# Patient Record
Sex: MALE | URBAN - METROPOLITAN AREA
[De-identification: names, ages, dates, MRNs, and addresses within clinical notes are randomized per-mention and may not be internally consistent; named-entity substitution may affect disease eponyms.]

---

## 2021-11-22 ENCOUNTER — OFFICE VISIT (OUTPATIENT)
Dept: URBAN - METROPOLITAN AREA CLINIC 23 | Facility: CLINIC | Age: 66
End: 2021-11-22
Payer: MEDICARE

## 2021-11-22 DIAGNOSIS — H25.813 COMBINED FORMS OF AGE-RELATED CATARACT, BILATERAL: Primary | ICD-10-CM

## 2021-11-22 PROCEDURE — 92004 COMPRE OPH EXAM NEW PT 1/>: CPT | Performed by: OPTOMETRIST

## 2021-11-22 ASSESSMENT — INTRAOCULAR PRESSURE
OD: 24
OS: 24

## 2021-11-22 ASSESSMENT — KERATOMETRY
OS: 45.88
OD: 43.00

## 2021-11-22 NOTE — IMPRESSION/PLAN
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 Condition: worsening. Plan: Cataracts account for the patient's complaints. Discussed diagnosis in detail with patient. Discussed all R/B's and procedures. Patient understands changing glasses prescription will not significantly improve vision. Patient elects to have surgery, phacoemulsification with intraocular lens recommended. Discussed lens options of Toric/Multifocal, or Standard lens, RL2. Will refer to cataract surgeon for pre-operative evaluation.

## 2022-01-07 ENCOUNTER — OFFICE VISIT (OUTPATIENT)
Dept: URBAN - METROPOLITAN AREA CLINIC 23 | Facility: CLINIC | Age: 67
End: 2022-01-07
Payer: MEDICARE

## 2022-01-07 DIAGNOSIS — H25.12 AGE-RELATED NUCLEAR CATARACT, LEFT EYE: ICD-10-CM

## 2022-01-07 PROCEDURE — 99204 OFFICE O/P NEW MOD 45 MIN: CPT | Performed by: OPHTHALMOLOGY

## 2022-01-07 ASSESSMENT — INTRAOCULAR PRESSURE
OD: 16
OS: 16

## 2022-01-07 ASSESSMENT — VISUAL ACUITY: OD: 20/200

## 2022-01-07 NOTE — IMPRESSION/PLAN
Impression: Age-related nuclear cataract, bilateral: H25.13. Condition: established, worsening. Symptoms: could improve with surgery. Plan: Cataract accounts for patient's complaints. Reviewed risks, benefits, and procedure. Patient desires surgery, schedule ce/iol OD then OS, RL2, discussed lens options, distance refractive target, patient is clear for surgery in [facility]. Recommend Dexycu to avoid noncompliance with drops and to help maintain infection/inflammation.
Yes

## 2022-02-14 ENCOUNTER — TESTING ONLY (OUTPATIENT)
Dept: URBAN - METROPOLITAN AREA CLINIC 23 | Facility: CLINIC | Age: 67
End: 2022-02-14
Payer: MEDICARE

## 2022-02-14 DIAGNOSIS — H25.13 AGE-RELATED NUCLEAR CATARACT, BILATERAL: Primary | ICD-10-CM

## 2022-02-14 ASSESSMENT — PACHYMETRY
OS: 2.95
OD: 23.26
OD: 2.82
OS: 23.16

## 2022-02-23 ENCOUNTER — SURGERY (OUTPATIENT)
Dept: URBAN - METROPOLITAN AREA SURGERY 11 | Facility: SURGERY | Age: 67
End: 2022-02-23
Payer: MEDICARE

## 2022-02-23 PROCEDURE — 66984 XCAPSL CTRC RMVL W/O ECP: CPT | Performed by: OPHTHALMOLOGY

## 2022-02-24 ENCOUNTER — POST-OPERATIVE VISIT (OUTPATIENT)
Dept: URBAN - METROPOLITAN AREA CLINIC 23 | Facility: CLINIC | Age: 67
End: 2022-02-24

## 2022-02-24 PROCEDURE — 99024 POSTOP FOLLOW-UP VISIT: CPT | Performed by: OPTOMETRIST

## 2022-02-24 ASSESSMENT — INTRAOCULAR PRESSURE
OD: 20
OS: 18

## 2022-02-24 NOTE — IMPRESSION/PLAN
Impression: S/P Cataract Extraction by phacoemulsification with IOL placement; DEXYCU OD - 1 Day. Encounter for surgical aftercare following surgery on a sense organ  Z48.810. Plan: Pt edu. Appropriate appearance and IOP. Call with any issues. rtc 1 wk PO. --Advised patient to use artificial tears for comfort.

## 2022-03-15 ENCOUNTER — POST-OPERATIVE VISIT (OUTPATIENT)
Dept: URBAN - METROPOLITAN AREA CLINIC 23 | Facility: CLINIC | Age: 67
End: 2022-03-15

## 2022-03-15 DIAGNOSIS — Z48.810 ENCOUNTER FOR SURGICAL AFTERCARE FOLLOWING SURGERY ON A SENSE ORGAN: Primary | ICD-10-CM

## 2022-03-15 PROCEDURE — 99024 POSTOP FOLLOW-UP VISIT: CPT | Performed by: OPTOMETRIST

## 2022-03-15 ASSESSMENT — INTRAOCULAR PRESSURE
OS: 16
OD: 16

## 2022-03-15 ASSESSMENT — VISUAL ACUITY: OD: 20/40

## 2022-03-15 NOTE — IMPRESSION/PLAN
Impression: S/P Cataract Extraction by phacoemulsification with IOL placement; DEXYCU OD - 20 Days. Encounter for surgical aftercare following surgery on a sense organ  Z48.810. Excellent post op course   Post operative instructions reviewed - Plan: Pt edu. Appropriate appearance and IOP. Proceed with 2nd eye.

## 2022-04-06 ENCOUNTER — SURGERY (OUTPATIENT)
Dept: URBAN - METROPOLITAN AREA SURGERY 11 | Facility: SURGERY | Age: 67
End: 2022-04-06
Payer: MEDICARE

## 2022-04-06 PROCEDURE — 66982 XCAPSL CTRC RMVL CPLX WO ECP: CPT | Performed by: OPHTHALMOLOGY

## 2022-04-07 ENCOUNTER — POST-OPERATIVE VISIT (OUTPATIENT)
Dept: URBAN - METROPOLITAN AREA CLINIC 23 | Facility: CLINIC | Age: 67
End: 2022-04-07
Payer: MEDICARE

## 2022-04-07 DIAGNOSIS — Z96.1 PRESENCE OF INTRAOCULAR LENS: Primary | ICD-10-CM

## 2022-04-07 PROCEDURE — 99024 POSTOP FOLLOW-UP VISIT: CPT | Performed by: OPTOMETRIST

## 2022-04-07 ASSESSMENT — INTRAOCULAR PRESSURE: OD: 18

## 2022-04-07 NOTE — IMPRESSION/PLAN
Impression: S/P Cataract Extraction by phacoemulsification with IOL placement; Capsular Tension Ring - Left Eye; DEXYCU OS - 1 Day. Presence of intraocular lens  Z96.1. Excellent post op course   Post operative instructions reviewed - Plan: Pt edu. Appropriate appearance and IOP. Call with any issues. rtc 1 wk PO. --Advised patient to use artificial tears for comfort.

## 2022-04-13 ENCOUNTER — POST-OPERATIVE VISIT (OUTPATIENT)
Dept: URBAN - METROPOLITAN AREA CLINIC 23 | Facility: CLINIC | Age: 67
End: 2022-04-13
Payer: MEDICARE

## 2022-04-13 DIAGNOSIS — Z96.1 PRESENCE OF INTRAOCULAR LENS: Primary | ICD-10-CM

## 2022-04-13 PROCEDURE — 99024 POSTOP FOLLOW-UP VISIT: CPT | Performed by: OPTOMETRIST

## 2022-04-13 NOTE — IMPRESSION/PLAN
Impression: S/P Cataract Extraction by phacoemulsification with IOL placement; Capsular Tension Ring - Left Eye; DEXYCU OS - 7 Days. Presence of intraocular lens  Z96.1. Excellent post op course   Post operative instructions reviewed - Plan: Pt edu. Appropriate appearance and IOP. RTC 3-4 wks for PO3 and Refraction. --Advised patient to use artificial tears for comfort.

## 2022-05-13 ENCOUNTER — POST-OPERATIVE VISIT (OUTPATIENT)
Dept: URBAN - METROPOLITAN AREA CLINIC 23 | Facility: CLINIC | Age: 67
End: 2022-05-13
Payer: MEDICARE

## 2022-05-13 DIAGNOSIS — H52.4 PRESBYOPIA: Primary | ICD-10-CM

## 2022-05-13 DIAGNOSIS — Z96.1 PRESENCE OF INTRAOCULAR LENS: ICD-10-CM

## 2022-05-13 PROCEDURE — 99024 POSTOP FOLLOW-UP VISIT: CPT | Performed by: OPTOMETRIST

## 2022-05-13 ASSESSMENT — VISUAL ACUITY
OD: 20/50
OS: 20/100

## 2022-05-13 ASSESSMENT — INTRAOCULAR PRESSURE
OS: 11
OD: 16

## 2022-05-13 NOTE — IMPRESSION/PLAN
Impression:  Presence of intraocular lens  Z96.1. Excellent post op course   Post operative instructions reviewed - Plan: Pt edu. Appropriate appearance and IOP. Use AT's PRN OU for comfort. Advised patient to call with any issues. RTC as scheduled. New Gl Rx today. Monitor in 1 yr. --Advised patient to use artificial tears for comfort.